# Patient Record
Sex: MALE | Race: OTHER | HISPANIC OR LATINO | URBAN - METROPOLITAN AREA
[De-identification: names, ages, dates, MRNs, and addresses within clinical notes are randomized per-mention and may not be internally consistent; named-entity substitution may affect disease eponyms.]

---

## 2024-07-19 ENCOUNTER — APPOINTMENT (RX ONLY)
Age: 49
Setting detail: DERMATOLOGY
End: 2024-07-19

## 2024-07-19 DIAGNOSIS — Z41.9 ENCOUNTER FOR PROCEDURE FOR PURPOSES OTHER THAN REMEDYING HEALTH STATE, UNSPECIFIED: ICD-10-CM

## 2024-07-19 PROCEDURE — ? EVO ND:YAG 1064NM

## 2024-07-19 NOTE — PROCEDURE: EVO ND:YAG 1064NM
Detail Level: Zone
Social Work

Dr notified this worker that overnight trending pulse ox showed pt does not need O2 at this time. SW updated [employer] to cancel referral. 

Liana Barnes MSW LSW
Add Another Setting?: no
Pulse Duration (Ms): 10
Procedure Note: Treatment was administered using the setting parameters listed above.
Anesthesia Type: 1% lidocaine with epinephrine
Fluence (J/Cm2): 105
Indication Override: flynn
Post-Care Instructions: I reviewed with the patient in detail post-care instructions. Patient should avoid sun exposure before and after treatment.  Diligent sunscreen use and sun avoidance advised.
Fluence (J/Cm2): 125
Rep Rate (Hz): 1.5
Price (Use Numbers Only, No Special Characters Or $): 205
Treatment Number: 1
Spot Size: 3mm
Total Pulses (Optional): 196
Cooling: Jr 4C
External Cooling Fan Speed: 0
Consent: Written consent obtained.  Risks reviewed including but not limited to erythema, swelling, crusting, scabbing, blistering, scarring, and darker or lighter pigmentary change.  Patient understands that results are not guaranteed and multiple treatments may be needed to achieve desired result.

## 2024-10-03 ENCOUNTER — APPOINTMENT (RX ONLY)
Age: 49
Setting detail: DERMATOLOGY
End: 2024-10-03

## 2024-10-03 DIAGNOSIS — Z41.9 ENCOUNTER FOR PROCEDURE FOR PURPOSES OTHER THAN REMEDYING HEALTH STATE, UNSPECIFIED: ICD-10-CM

## 2024-10-03 PROCEDURE — ? EVO ND:YAG 1064NM

## 2024-10-03 NOTE — PROCEDURE: EVO ND:YAG 1064NM
Price (Use Numbers Only, No Special Characters Or $): 0
Cooling: Jr 4C
Add Another Setting?: no
Total Pulses (Optional): 95
Spot Size: 3mm
Treatment Number: 2
Pulse Duration (Ms): 10
Consent: Written consent obtained.  Risks reviewed including but not limited to erythema, swelling, crusting, scabbing, blistering, scarring, and darker or lighter pigmentary change.  Patient understands that results are not guaranteed and multiple treatments may be needed to achieve desired result.
Anesthesia Type: 1% lidocaine with epinephrine
Procedure Note: Treatment was administered using the setting parameters listed above.
Fluence (J/Cm2): 125
Post-Care Instructions: I reviewed with the patient in detail post-care instructions. Patient should avoid sun exposure before and after treatment.  Diligent sunscreen use and sun avoidance advised.
Fluence (J/Cm2): 110
Detail Level: Zone
Rep Rate (Hz): 1.5